# Patient Record
Sex: FEMALE | Race: WHITE | NOT HISPANIC OR LATINO | URBAN - NONMETROPOLITAN AREA
[De-identification: names, ages, dates, MRNs, and addresses within clinical notes are randomized per-mention and may not be internally consistent; named-entity substitution may affect disease eponyms.]

---

## 2022-02-10 ENCOUNTER — TELEMEDICINE (OUTPATIENT)
Dept: PSYCHIATRY | Facility: CLINIC | Age: 32
End: 2022-02-10

## 2022-02-10 DIAGNOSIS — F33.2 SEVERE EPISODE OF RECURRENT MAJOR DEPRESSIVE DISORDER, WITHOUT PSYCHOTIC FEATURES: Primary | ICD-10-CM

## 2022-02-10 DIAGNOSIS — F41.1 GENERALIZED ANXIETY DISORDER: ICD-10-CM

## 2022-02-10 DIAGNOSIS — Z79.899 MEDICATION MANAGEMENT: ICD-10-CM

## 2022-02-10 PROCEDURE — 90792 PSYCH DIAG EVAL W/MED SRVCS: CPT | Performed by: NURSE PRACTITIONER

## 2022-02-10 RX ORDER — BUPROPION HYDROCHLORIDE 150 MG/1
150 TABLET ORAL EVERY MORNING
Qty: 30 TABLET | Refills: 0 | Status: SHIPPED | OUTPATIENT
Start: 2022-02-10 | End: 2022-03-10 | Stop reason: SDUPTHER

## 2022-02-10 RX ORDER — HYDROXYZINE PAMOATE 25 MG/1
25 CAPSULE ORAL 3 TIMES DAILY PRN
Qty: 45 CAPSULE | Refills: 0 | Status: SHIPPED | OUTPATIENT
Start: 2022-02-10 | End: 2022-03-10 | Stop reason: SDUPTHER
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
## 2022-03-10 ENCOUNTER — TELEMEDICINE (OUTPATIENT)
Dept: PSYCHIATRY | Facility: CLINIC | Age: 32
End: 2022-03-10

## 2022-03-10 DIAGNOSIS — F41.1 GENERALIZED ANXIETY DISORDER: ICD-10-CM

## 2022-03-10 DIAGNOSIS — Z79.899 MEDICATION MANAGEMENT: ICD-10-CM

## 2022-03-10 DIAGNOSIS — F33.2 SEVERE EPISODE OF RECURRENT MAJOR DEPRESSIVE DISORDER, WITHOUT PSYCHOTIC FEATURES: Primary | ICD-10-CM

## 2022-03-10 PROCEDURE — 99214 OFFICE O/P EST MOD 30 MIN: CPT | Performed by: NURSE PRACTITIONER

## 2022-03-10 RX ORDER — BUSPIRONE HYDROCHLORIDE 7.5 MG/1
TABLET ORAL
Qty: 60 TABLET | Refills: 0 | Status: SHIPPED | OUTPATIENT
Start: 2022-03-10 | End: 2022-04-14 | Stop reason: SDUPTHER

## 2022-03-10 RX ORDER — HYDROXYZINE PAMOATE 25 MG/1
25 CAPSULE ORAL 3 TIMES DAILY PRN
Qty: 45 CAPSULE | Refills: 0 | Status: SHIPPED | OUTPATIENT
Start: 2022-03-10 | End: 2022-09-15

## 2022-03-10 RX ORDER — BUPROPION HYDROCHLORIDE 300 MG/1
300 TABLET ORAL EVERY MORNING
Qty: 30 TABLET | Refills: 0 | Status: SHIPPED | OUTPATIENT
Start: 2022-03-10 | End: 2022-04-13 | Stop reason: SDUPTHER

## 2022-04-07 ENCOUNTER — PATIENT MESSAGE (OUTPATIENT)
Dept: PSYCHIATRY | Facility: CLINIC | Age: 32
End: 2022-04-07

## 2022-04-07 ENCOUNTER — TELEMEDICINE (OUTPATIENT)
Dept: PSYCHIATRY | Facility: CLINIC | Age: 32
End: 2022-04-07

## 2022-04-07 DIAGNOSIS — F41.1 GENERALIZED ANXIETY DISORDER: ICD-10-CM

## 2022-04-07 DIAGNOSIS — F33.2 SEVERE EPISODE OF RECURRENT MAJOR DEPRESSIVE DISORDER, WITHOUT PSYCHOTIC FEATURES: Primary | ICD-10-CM

## 2022-04-07 PROCEDURE — 90791 PSYCH DIAGNOSTIC EVALUATION: CPT | Performed by: SOCIAL WORKER
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
## 2022-04-13 DIAGNOSIS — Z79.899 MEDICATION MANAGEMENT: ICD-10-CM

## 2022-04-13 DIAGNOSIS — F33.2 SEVERE EPISODE OF RECURRENT MAJOR DEPRESSIVE DISORDER, WITHOUT PSYCHOTIC FEATURES: ICD-10-CM

## 2022-04-13 DIAGNOSIS — F41.1 GENERALIZED ANXIETY DISORDER: ICD-10-CM

## 2022-04-13 RX ORDER — BUPROPION HYDROCHLORIDE 300 MG/1
300 TABLET ORAL EVERY MORNING
Qty: 30 TABLET | Refills: 0 | Status: SHIPPED | OUTPATIENT
Start: 2022-04-13 | End: 2022-04-14 | Stop reason: SDUPTHER

## 2022-04-14 ENCOUNTER — TELEMEDICINE (OUTPATIENT)
Dept: PSYCHIATRY | Facility: CLINIC | Age: 32
End: 2022-04-14

## 2022-04-14 DIAGNOSIS — Z79.899 MEDICATION MANAGEMENT: ICD-10-CM

## 2022-04-14 DIAGNOSIS — F33.2 SEVERE EPISODE OF RECURRENT MAJOR DEPRESSIVE DISORDER, WITHOUT PSYCHOTIC FEATURES: Primary | ICD-10-CM

## 2022-04-14 DIAGNOSIS — F41.1 GENERALIZED ANXIETY DISORDER: ICD-10-CM

## 2022-04-14 PROCEDURE — 99214 OFFICE O/P EST MOD 30 MIN: CPT | Performed by: NURSE PRACTITIONER

## 2022-04-14 RX ORDER — BUSPIRONE HYDROCHLORIDE 7.5 MG/1
TABLET ORAL
Qty: 60 TABLET | Refills: 2 | Status: SHIPPED | OUTPATIENT
Start: 2022-04-14 | End: 2022-09-15 | Stop reason: SDUPTHER

## 2022-04-14 RX ORDER — BUPROPION HYDROCHLORIDE 300 MG/1
300 TABLET ORAL EVERY MORNING
Qty: 30 TABLET | Refills: 2 | Status: SHIPPED | OUTPATIENT
Start: 2022-04-14 | End: 2022-09-15 | Stop reason: SDUPTHER

## 2022-05-12 ENCOUNTER — PATIENT MESSAGE (OUTPATIENT)
Dept: PSYCHIATRY | Facility: CLINIC | Age: 32
End: 2022-05-12

## 2022-05-12 ENCOUNTER — TELEMEDICINE (OUTPATIENT)
Dept: PSYCHIATRY | Facility: CLINIC | Age: 32
End: 2022-05-12

## 2022-05-12 DIAGNOSIS — F41.1 GENERALIZED ANXIETY DISORDER: ICD-10-CM

## 2022-05-12 DIAGNOSIS — F33.2 SEVERE EPISODE OF RECURRENT MAJOR DEPRESSIVE DISORDER, WITHOUT PSYCHOTIC FEATURES: Primary | ICD-10-CM

## 2022-05-12 PROCEDURE — 90837 PSYTX W PT 60 MINUTES: CPT | Performed by: SOCIAL WORKER

## 2022-05-12 NOTE — PROGRESS NOTES
"Date of Service: May 12, 2022  Time In: 9:00 am  Time Out: 9:55 am    PROGRESS NOTE  Data:The patient is a 32 y.o. person who met 1:1 with Mary Robledo, [facility] for regularly scheduled individual session.Verified the patients identity.  The Patient is  at home, using Epic Video Visit (HIPAA compliant). Patient is being seen via telehealth and stated they are in a secure environment for this session. The patient's condition being diagnosed/treated is appropriate for telemedicine. The provider identified herself and credentials [facility] .   The patient  consent to be seen remotely, and when consent is given they understand that the consent allows for patient identifiable information to be sent to a third party as needed.   They may refuse to be seen remotely at any time. The electronic data is encrypted and password protected, and the patient has been advised of the potential risks to privacy not withstanding such measured of the potential risks to privacy not withstanding such measures.    Alexandrea presents for session on time, clean and casually dressed  without evidence of intoxication, withdrawal, or perceptual disturbance.   The patient was open and engaged.    Chief Compliant: Patient presents with anxiety & depression  Interactive Complexity: Interactive Complexity No If yes, due to:     HPI: Data:  Patient shares that she has been able to change her thinking and \"just accept\" that Jorge Luis's Mom is who she is and its wont change.  She has begun to set boundaries with Jorge Luis's Mom and just take care of Jorge Luis and not worry about his Moms response.  Patient shares several interactions with John Mom that are difficult/stressful.  She shares that she has been able to accept \"what is\" and stop worrying about things she cannot control.  Patient shares that Jorge Luis comes back to her house and just wants to be with the patient.  Patient continue to be around horses which she find helpful. "   Depression Low mood for > 2 weeks, Feelings of guilt/worthlessness and Impaired concentration  Generalized Anxiety  Excess Worry, Restless/Edgy, Easily fatigued and Muscle tension. Onset of symptoms was vague.  Symptoms are associated with relationship problem with unchanged since last visit and lack of support.  Symptoms are aggravated by anxiety, sadness and stress.   Symptoms improve with medication management, therapy and personal self-care (wellness) Current rates severity of symptoms, on a scale of 1-10 (10 is the most severe) 4 Context Family and social history was reviewed a Quality improved.    CLINICAL MANEUVERING/INTERVENTION/SUPPORTIVE PSYCHOTHERAPY: Therapist continued to promote the therapeutic alliance, address the patient’s issues, and strengthen self awareness, insights, and coping skills.  Reviewed cognitive distortions and ways to challenge these negative thoughts. Processing the thoughts feelings with recent happening while also discussing ways to improve the transition for Jorge Luis when going from one parent to the other. Discussed foundations of mindfulness and sent handout to the PT for review and encouraged her to practice these skills. herapist applied CBT/REBT, Exploration of Coping Skills and Exploration of Relationship Patterns and encouraged the patient to use positive coping skills such as Exercising, Journaling, Listen to music, Reframe the way you are thinking about the problem, Self Care (Take care of your body in a way that makes you feel good - paint your nails, do your hair, put on a face mask), Establish healthy boundaries , Walk away (leave a situation that is causing you stress), Use positive self-talk, Keep a positive attitude, Keep calm by thinking and Utilize resources/coping skills.  Therapist allowed Alexandrea  to freely discuss issues without interruption or judgment. Provided safe, confidential environment to facilitate the development of positive therapeutic relationship  and encourage open, honest communication. Assisted patient in identifying increased risk factors which would indicate the need for higher level of care including thoughts to harm self or others, self-harming behavior, and/or binge drinking and encouraged patient to contact this office, call 911, or present to the nearest emergency room should any of these events occur. Discussed crisis intervention services and means to access.     Risk Assessment:  [x] No SI/HI, []  passive thoughts []  suicidal ideation , []  homicidal ideation, [] self-harm Explain Risk for self harm is low at this time.        Psychiatric/Behavioral: Negative for agitation, behavioral problems, decreased concentration, dysphoric mood, hallucinations, self-injury, sleep disturbance, suicidal ideas, negative for hyperactivity. Positive for depressed mood-improving and stress. The patient is nervous/anxious.          Mental Status Exam:    Hygiene:   good  Cooperation:  Cooperative  Eye Contact:  Good  Psychomotor Behavior:  Appropriate  Affect:  Full range and Appropriate  Hopelessness: Denies  Speech:  Normal  Goal directed and Linear  Thought Content:  Normal  Suicidal:  None  Homicidal:  None  Hallucinations:  None  Delusion:  None  Memory:  Intact  Orientation:  Person, Place, Time and Situation  Reliability:  good  Insight:  Good and Fair  Judgement:  Good  Impulse Control:  Good and Fair    Patient's Support Network Includes:  , son, parents and extended family  Progress toward goal: Not at goal  Functional Status: Moderate impairment   Overall: Anxious   VISIT DIAGNOSIS:     ICD-10-CM ICD-9-CM   1. Severe episode of recurrent major depressive disorder, without psychotic features ([facility])  F33.2 296.33   2. Generalized anxiety disorder  F41.1 300.02      PROGNOSIS: fair if patient follows treatment recommendations  The patient appears to be mentally/physically stable compared to his baseline functioning.  However, they continues to  present with a severe chronic mental illness. As a result,  they would be at significantly increased risk for decompensation and possibly higher level of care without continued treatment.  It is reasonable to assume they would considerably benefit from ongoing treatment.      PROGRESS TOWARD CURRENT PLAN OF CARE/TREATMENT PLAN:  Making Progress  SHORT-TERM GOALS: The patient will  will learn and practice at least 2 anxiety management techniques with goal of decreasing anxiety, will learn and practice at least 2 depression management techniques with goal of decreasing depression, will work with therapist to help expose and extinguish irrational beliefs and conclusions that contribute to anxiety/depression, will engage in one self-care activity daily, per self-report and will engage in one enjoyable activity daily, per self-report   LONG-TERM GOALS: With the help of therapy, I would like to:   learn how to relax and take it easy and learn how to enjoy life and have fun  clarify or come to terms with expectations or feelings related to my partner, spouse, or significant other, learn how to be more assertive with others and set appropriate boundaries and learn how to handle other people's reactions to my behavior (criticism, rejection, praise, etc.).  come to terms with things that happened in the past and understand more clearly who I am, what I' m capable of, and what I want out of life  clarify my needs and desires and learn how to express them more effectively, allow myself to experience feelings and express them more effectively and learn how to deal with strong negative feelings (e.g., anger, rage)  learn how to cope with negative thoughts, ruminations, or sense of guilt, find a way out of negative mood, sadness, or sense of inner emptiness, learn how be more organized in daily life and learn how to handle stressful situations better  STRENGTHS: Motivated for treatment, Literate, Employed, Good family support and  "Articulate  WEAKNESSES: Poor social support and Substance use  Plan   Crisis Plan:  Symptoms and/or behaviors to indicate a crisis: Extreme mood changes; including uncontrollable \"highs\" or euphoria and Thinking about suicide    What calming techniques or other strategies will patient use to de-esclate and stay safe: slow down, breathe, visualize calming self, think it though, listen to music, change focus, take a walk  Who is one person patient can contact to assist with de-escalation? Saurav Tirado  Crisis Management: the Patient will contact staff or crisis line if symptoms exacerbate or if harm to self or others becomes a concern. Crisis resources include: Crisis Line [phone], 911, Local Law Enforcement, [facility], [facility] 24/7 Emergency Room [phone].  PLAN:   Will continue in MONTHLY or AS NEEDED ongoing outpatient treatment via Face-to-Facewith primary therapist and pharmacotherapy as scheduled.   Will  report any adverse reactions to treatment/medication interventions immediately.  Will be compliant with treatment and appointments.   May 12, 2022 09:04 EDT    Recommended Referrals: Psychiatrist/APRN and Medical Provider (PCP)  Patient will adhere to medication regimen as prescribed and report any side effects. Patient will contact this office, call 911 or present to the nearest emergency room should suicidal or homicidal ideations occur. Provide Cognitive Behavioral Therapy and Solution Focused Therapy to improve functioning, maintain stability, and avoid decompensation and the need for higher level of care.          Future Appointments       Provider Department Center    7/21/2022 9:00 AM Mounika Perez APRN [facility]          May 12, 2022 09:04 EDT      Mary Robledo LCSW, [facility]           "

## 2022-06-23 ENCOUNTER — PATIENT MESSAGE (OUTPATIENT)
Dept: PSYCHIATRY | Facility: CLINIC | Age: 32
End: 2022-06-23

## 2022-06-23 ENCOUNTER — TELEMEDICINE (OUTPATIENT)
Dept: PSYCHIATRY | Facility: CLINIC | Age: 32
End: 2022-06-23

## 2022-06-23 DIAGNOSIS — F33.2 SEVERE EPISODE OF RECURRENT MAJOR DEPRESSIVE DISORDER, WITHOUT PSYCHOTIC FEATURES: Primary | ICD-10-CM

## 2022-06-23 DIAGNOSIS — F41.1 GENERALIZED ANXIETY DISORDER: ICD-10-CM

## 2022-06-23 PROCEDURE — 90837 PSYTX W PT 60 MINUTES: CPT | Performed by: SOCIAL WORKER

## 2022-06-23 NOTE — PROGRESS NOTES
Date of Service: June 23, 2022  Time In: 9:00 am  Time Out: 9:54 am    PROGRESS NOTE    Data:The patient is a 32 y.o. person who met 1:1 with Mary Robledo, [facility] for regularly scheduled individual session.Verified the patients identity.  The Patient is  at home, using Epic Video Visit (HIPAA compliant). Patient is being seen via telehealth and stated they are in a secure environment for this session. The patient's condition being diagnosed/treated is appropriate for telemedicine. The provider identified herself and credentials [facility] .   The patient  consent to be seen remotely, and when consent is given they understand that the consent allows for patient identifiable information to be sent to a third party as needed.   They may refuse to be seen remotely at any time. The electronic data is encrypted and password protected, and the patient has been advised of the potential risks to privacy not withstanding such measured of the potential risks to privacy not withstanding such measures.    Alexandrea presents for session on time, clean and casually dressed  without evidence of intoxication, withdrawal, or perceptual disturbance.   The patient was open and engaged.Chief Compliant: Patient presents with anxiety & depression mild    HPI: Data:  Patient shares that she is feeling pretty good and is looking forward to vacation in 2 weeks.  She took of a total of 2 weeks, 1 traveling and 1 at home. Shares past argument with  and handled it better and less reactivity.  Shares ongoing stress with step-sons Mom and how she was able to manage this without reacting. Cals Mom also ruined husbands birthday.  Patient shares that she is not having any noticeable symptoms of depression, coworkers have noticed improvement in her mood and she feels more in control of the low moods.  Anxiety is more situational, especially when she is around Callums bio-Mom or her family.  She shares that she tries to avoid  "any conflict with them but still finds herself worried. Transitions between houses is better but his whinnying is tiring.   Had some worries about work and \"getting closer to bonus\".  Favorite boss will be leaving and patient is concerned that if old boss comes back she will not be able to survive. Has plans to discuss with head boss about her concerns.   Onset of symptoms was vague.  Symptoms are associated with relationship problem with step-sons Mom and stable monogamous marriage and lack of support.  Symptoms are aggravated by anxiety, sadness and stress.   Symptoms improve with medication management, therapy, self-management and personal self-care (wellness) Current rates severity of symptoms, on a scale of 1-10 (10 is the most severe) 4 Context Family and social history was reviewed  Quality much improved.    CLINICAL MANEUVERING/INTERVENTION/SUPPORTIVE PSYCHOTHERAPY: Therapist continued to promote the therapeutic alliance, address the patient’s issues, and strengthen self awareness, insights, and coping skills.  Reviewed progress and praised for her progress. Continued to assist the patient to identify negative cognitions, predicting the future, should statements or jumping to conclusions.   Therapist applied CBT/REBT, Exploration of Coping Skills, Interactive Feedback, Mindfulness Training and Review of Treatment Plan and encouraged the patient to use positive coping skills such as Exercising, Listen to music, Spending time in nature, Meditation/Practice yoga, Reframe the way you are thinking about the problem, Self Care (Take care of your body in a way that makes you feel good - paint your nails, do your hair, put on a face mask), Establish healthy boundaries , Use positive self-talk, Keep a positive attitude, Take deep breaths, Keep calm by thinking and Utilize resources/coping skills.  Therapist allowed Alexandrea  to freely discuss issues without interruption or judgment. Provided safe, confidential " environment to facilitate the development of positive therapeutic relationship and encourage open, honest communication. Assisted patient in identifying increased risk factors which would indicate the need for higher level of care including thoughts to harm self or others, self-harming behavior, and/or binge drinking and encouraged patient to contact this office, call 911, or present to the nearest emergency room should any of these events occur. Discussed crisis intervention services and means to access.     Risk Assessment:  [x] No SI/HI, []  passive thoughts []  suicidal ideation , []  homicidal ideation, [] self-harm Explain Risk of self-harm is low, but could be further elevated in the event of treatment noncompliance and/or AODA.  Assessment      Psychiatric/Behavioral: Negative for agitation, behavioral problems, decreased concentration, dysphoric mood, hallucinations, self-injury, sleep disturbance, suicidal ideas, negative for hyperactivity. Positive for stress. The patient is nervous/anxious.          Mental Status Exam:    Hygiene:   good  Cooperation:  Cooperative  Eye Contact:  Good  Psychomotor Behavior:  Appropriate  Affect:  Full range and Appropriate  Hopelessness: Denies  Speech:  Normal  Goal directed and Linear  Thought Content:  Normal and Mood congruent  Suicidal:  None  Homicidal:  None  Hallucinations:  None  Delusion:  None  Memory:  Intact  Orientation:  Person, Place, Time and Situation  Reliability:  good  Insight:  Good and Fair  Judgement:  Good  Impulse Control:  Good    Patient's Support Network Includes:   and extended family  Progress toward goal: Not at goal  Functional Status: Moderate impairment   Overall: Anxious   VISIT DIAGNOSIS:     ICD-10-CM ICD-9-CM   1. Severe episode of recurrent major depressive disorder, without psychotic features ([facility])  F33.2 296.33   2. Generalized anxiety disorder  F41.1 300.02      PROGNOSIS: fair if patient follows treatment  recommendations  The patient appears to be mentally/physically stable compared to his baseline functioning.  However, they continues to present with a severe chronic mental illness. As a result,  they would be at significantly increased risk for decompensation and possibly higher level of care without continued treatment.  It is reasonable to assume they would considerably benefit from ongoing treatment.     PROGRESS TOWARD CURRENT PLAN OF CARE/TREATMENT PLAN:  Making Progress  SHORT-TERM GOALS: The patient will  will learn and practice at least 2 anxiety management techniques with goal of decreasing anxiety, will learn and practice at least 2 depression management techniques with goal of decreasing depression, will engage in one self-care activity daily, per self-report and will engage in one enjoyable activity daily, per self-report     LONG-TERM GOALS: With the help of therapy, I would like to:   learn how to relax and take it easy and learn how to enjoy life and have fun  learn how to be more assertive with others and set appropriate boundaries and learn how to handle other people's reactions to my behavior (criticism, rejection, praise, etc.).  come to terms with things that happened in the past and understand more clearly who I am, what I' m capable of, and what I want out of life  clarify my needs and desires and learn how to express them more effectively, learn how to adjust overly high expectations I have in myself or others, allow myself to experience feelings and express them more effectively and learn how to deal with strong negative feelings (e.g., anger, rage)  learn how to cope with negative thoughts, ruminations, or sense of guilt, find a way out of negative mood, sadness, or sense of inner emptiness, learn how be more organized in daily life and learn how to handle stressful situations better    STRENGTHS: Motivated for treatment, Literate, Employed, Good family support and Articulate    WEAKNESSES:  Poor social support and Poor coping skills    Plan   Crisis Plan:  Symptoms and/or behaviors to indicate a crisis: Thinking about suicide    What calming techniques or other strategies will patient use to de-esclate and stay safe: slow down, breathe, visualize calming self, think it though, listen to music, change focus, take a walk  Who is one person patient can contact to assist with de-escalation? [place]  Crisis Management: the Patient will contact staff or crisis line if symptoms exacerbate or if harm to self or others becomes a concern. Crisis resources include: Crisis Line [phone], 911, Local Law Enforcement, [facility] [place], [facility] 24/7 Emergency Room [phone].  PLAN:   Will continue in MONTHLY ongoing outpatient treatment via Teleheath Video via Epic Video Visit (HIPAA compliant)with primary therapist and pharmacotherapy as scheduled.   Will  report any adverse reactions to treatment/medication interventions immediately.  Will be compliant with treatment and appointments.   June 23, 2022 09:00 EDT  Recommended Referrals: Psychiatrist/APRN and Medical Provider (PCP)  Patient will adhere to medication regimen as prescribed and report any side effects. Patient will contact this office, call 911 or present to the nearest emergency room should suicidal or homicidal ideations occur. Provide Cognitive Behavioral Therapy and Solution Focused Therapy to improve functioning, maintain stability, and avoid decompensation and the need for higher level of care.        Future Appointments       Provider Department Center    7/21/2022 9:00 AM Mounika Perez APRN [facility]    8/4/2022 9:00 AM Mary Dias LCSW [facility]        June 23, 2022 09:00 EDT    Mary Robledo LCSW, [facility]

## 2022-08-04 ENCOUNTER — TELEMEDICINE (OUTPATIENT)
Dept: PSYCHIATRY | Facility: CLINIC | Age: 32
End: 2022-08-04

## 2022-08-04 DIAGNOSIS — F41.1 GENERALIZED ANXIETY DISORDER: ICD-10-CM

## 2022-08-04 DIAGNOSIS — F33.2 SEVERE EPISODE OF RECURRENT MAJOR DEPRESSIVE DISORDER, WITHOUT PSYCHOTIC FEATURES: Primary | ICD-10-CM

## 2022-08-04 PROCEDURE — 90834 PSYTX W PT 45 MINUTES: CPT | Performed by: SOCIAL WORKER

## 2022-08-04 NOTE — PROGRESS NOTES
"Date of Service: August 4, 2022  Time In: 9:00 am  Time Out: 9:55 am  PROGRESS NOTE  Data:The patient is a 32 y.o. person who met 1:1 with Mary Robledo, SOL KIM[facility]ALL for regularly scheduled individual session.Verified the patients identity.  The Patient is  at home, using Epic Video Visit (HIPAA compliant). Patient is being seen via telehealth and stated they are in a secure environment for this session. The patient's condition being diagnosed/treated is appropriate for telemedicine. The provider identified herself and credentials SOL [facility] .   The patient  consent to be seen remotely, and when consent is given they understand that the consent allows for patient identifiable information to be sent to a third party as needed.   They may refuse to be seen remotely at any time. The electronic data is encrypted and password protected, and the patient has been advised of the potential risks to privacy not withstanding such measured of the potential risks to privacy not withstanding such measures.    Alexandrea presents for session on time, clean and casually dressed  without evidence of intoxication, withdrawal, or perceptual disturbance.   The patient was open and engaged.      Chief Compliant: Patient presents with anxiety & depression    HPI: Data:  Went on vacation and had a few blow-ups when her future mother-in-law was being awful on the way home and \"I just had it\".  EFRAÍN hates the heat and they were in [place]. EFRAÍN was upset when PT took her 5 yr old to the bathroom and EFRAÍN felt ignored and left out.  EFRAÍN often butted in while she was trying to disciplining her son.  Overall the vacation was good but EFRAÍN wont be coming next time.  EFRAÍN says wont be coming to her house weekly like she had in the past.  Son has been crying when he has to come to their house.  Son Andi says he does not know why he is upset coming to there house. Gives updates about work, making her bonus, her boss has left and there are several " new people that are interviewing. The old boss told her that if things get worse there could be a position at his new place of employment-which is reassuring to have a back up plan.  Taking a break from horse back riding trying to save money. 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  Iron and B 12 deficiency which is being treated.   Depression Feelings of guilt/worthlessness and Low energy  Generalized Anxiety  Excess Worry, Restless/Edgy and Muscle tension. Onset of symptoms was vague.  Symptoms are associated with relationship problem with mother-in-law and sons biological mom/step dad and lack of support.  Symptoms are aggravated by anxiety and stress.   Symptoms improve with medication management, therapy and personal self-care (wellness) Current rates severity of symptoms, on a scale of 1-10 (10 is the most severe) 4 Context Family and social history was reviewed Quality improved.    CLINICAL MANEUVERING/INTERVENTION/SUPPORTIVE PSYCHOTHERAPY: Therapist continued to promote the therapeutic alliance, address the patient’s issues, and strengthen self awareness, insights, and coping skills. Processing patient feelings and thoughts that were triggered while on vacation.Discussing discharge plans and what she needs to do to maintain progress.   Taking Wellbutrin consistently, using buspar prn.  Therapist applied CBT/REBT, Cognitive Challenging, Person Centered and Positive Coping Skills and encouraged the patient to use positive coping skills such as Exercising, Journaling, Playing with a pet, Reframe the way you are thinking about the problem, Self Care (Take care of your body in a way that makes you feel good - paint your nails, do your hair, put on a face mask), Time management (Work on managing your time better - for example, turn off the alerts on your phone), Establish healthy boundaries , Use positive self-talk, Keep a positive attitude,  Laugh (Do something fun), Keep calm by  thinking and Utilize resources/coping skills.  Therapist allowed Alexandrea  to freely discuss issues without interruption or judgment. Provided safe, confidential environment to facilitate the development of positive therapeutic relationship and encourage open, honest communication. Assisted patient in identifying increased risk factors which would indicate the need for higher level of care including thoughts to harm self or others, self-harming behavior, and/or binge drinking and encouraged patient to contact this office, call 911, or present to the nearest emergency room should any of these events occur. Discussed crisis intervention services and means to access.    PHQ-9 Total Score:   0  ERIN-7  2  Feeling nervous, anxious or on edge: Several days  Not being able to stop or control worrying: Not at all  Worrying too much about different things: Several days  Trouble Relaxing: Not at all  Being so restless that it is hard to sit still: Not at all  Becoming easily annoyed or irritable: Not at all  ERIN 7 Total Score: 2  Risk Assessment:  [x] No SI/HI, []  passive thoughts []  suicidal ideation , []  homicidal ideation, [] self-harm Explain Risk of self-harm is low, but could be further elevated in the event of treatment noncompliance and/or AODA.  Assessment   Psychiatric/Behavioral: Negative for agitation, behavioral problems, decreased concentration, dysphoric mood, hallucinations, self-injury, sleep disturbance, suicidal ideas, negative for hyperactivity. Positive for depressed mood-much improved and stress. The patient is nervous/anxious.      Mental Status Exam:    Hygiene:   good  Cooperation:  Cooperative  Eye Contact:  Good  Psychomotor Behavior:  Appropriate  Affect:  Appropriate  Hopelessness: Denies  Speech:  Normal  Goal directed and Linear  Thought Content:  Normal and Mood congruent  Suicidal:  None  Homicidal:  None  Hallucinations:  None  Delusion:  None  Memory:  Intact  Orientation:  Person, Place,  Time and Situation  Reliability:  good  Insight:  Good  Judgement:  Good  Impulse Control:  Good    Patient's Support Network Includes:  , son and extended family    Progress toward goal: Not at goal    Functional Status: Moderate impairment     Overall: WNL     VISIT DIAGNOSIS:     ICD-10-CM ICD-9-CM   1. Severe episode of recurrent major depressive disorder, without psychotic features ([facility])  F33.2 296.33   2. Generalized anxiety disorder  F41.1 300.02        PROGNOSIS: good if patient follows treatment recommendations    The patient appears to be mentally/physically stable compared to his baseline functioning.  However, they continues to present with a severe chronic mental illness. As a result,  they would be at significantly increased risk for decompensation and possibly higher level of care without continued treatment.  It is reasonable to assume they would considerably benefit from ongoing treatment.          PROGRESS TOWARD CURRENT PLAN OF CARE/TREATMENT PLAN:  Making Progress    SHORT-TERM GOALS: The patient will  will work with therapist to help expose and extinguish irrational beliefs and conclusions that contribute to anxiety/depression, will work with therapist to identify conflicts from the past and the present that form the basis, will engage in one self-care activity daily, per self-report and will engage in one enjoyable activity daily, per self-report     LONG-TERM GOALS: With the help of therapy, I would like to:   learn how to relax and take it easy and learn how to enjoy life and have fun  improve or clarify my relationship with people I know, learn how to be more assertive with others and set appropriate boundaries and learn how to handle other people's reactions to my behavior (criticism, rejection, praise, etc.).  understand more clearly who I am, what I' m capable of, and what I want out of life  clarify my needs and desires and learn how to express them more effectively, allow myself  to experience feelings and express them more effectively and learn how to deal with strong negative feelings (e.g., anger, rage)  learn how to cope with negative thoughts, ruminations, or sense of guilt, find a way out of negative mood, sadness, or sense of inner emptiness, cope with specific problems related to work, school, or training, learn how be more organized in daily life and learn how to handle stressful situations better    STRENGTHS: Motivated for treatment, Literate, Employed, Good family support, Articulate and Has insight    WEAKNESSES: Poor social support and Poor coping skills    Plan   Crisis Plan:  Symptoms and/or behaviors to indicate a crisis: Thinking about suicide    What calming techniques or other strategies will patient use to de-esclate and stay safe: slow down, breathe, visualize calming self, think it though, listen to music, change focus, take a walk  Who is one person patient can contact to assist with de-escalation? [place]    Crisis Management: the Patient will contact staff or crisis line if symptoms exacerbate or if harm to self or others becomes a concern. Crisis resources include: Crisis Line [phone], 911, Local Law Enforcement, Lists of hospitals in the [place], Psychiatric 24/7 Emergency Room [phone].    PLAN: Will increase length of time between sessions to see if patient can maintain progress. If so then will close her therapy treatment until the time she wants to re-engage in treatment.  Will continue in MONTHLY or QUARTERLY ongoing outpatient treatment via Teleheath Video via Epic Video Visit (HIPAA compliant)with primary therapist and pharmacotherapy as scheduled.   Will  report any adverse reactions to treatment/medication interventions immediately.  Will be compliant with treatment and appointments.   August 4, 2022 09:45 EDT    Recommended Referrals: Psychiatrist/APRN  Patient will adhere to medication regimen as prescribed and report any side effects. Patient will contact this office,  call 911 or present to the nearest emergency room should suicidal or homicidal ideations occur. Provide Cognitive Behavioral Therapy and Solution Focused Therapy to improve functioning, maintain stability, and avoid decompensation and the need for higher level of care.          Future Appointments       Provider Department Center    8/10/2022 4:15 PM Mounika Perez APRN [facility]          August 4, 2022 09:45 EDT      Mary Robledo, JIGRA, [facility]

## 2022-08-04 NOTE — TREATMENT PLAN
Multi-Disciplinary Problems (from Behavioral Health Treatment Plan)    Active Problems     Problem: Anxiety  Start Date: 05/11/22    Problem Details: The patient self-scales this problem as a 6 with 10 being the worst.        Goal Priority Start Date Expected End Date End Date    Patient will develop and implement behavioral and cognitive strategies to reduce anxiety and irrational fears. -- 08/04/22 -- --    Goal Details: Progress toward goal:  The patient self-scales their progress related to this goal as a 3 with 10 being the worst.        Goal Intervention Frequency Start Date End Date    Help patient explore past emotional issues in relation to present anxiety. Q Month 08/04/22 --    Intervention Details: Duration of treatment until until discharged.        Goal Intervention Frequency Start Date End Date    Help patient develop an awareness of their cognitive and physical responses to anxiety. Q Month 08/04/22 --    Intervention Details: Duration of treatment until until discharged.              Problem: Depression  Start Date: 08/04/22    Problem Details: The patient self-scales this problem as a 6 with 10 being the worst.        Goal Priority Start Date Expected End Date End Date    Patient will demonstrate the ability to initiate new constructive life skills outside of sessions on a consistent basis. -- 08/04/22 -- --    Goal Details: Progress toward goal:  The patient self-scales their progress related to this goal as a 3 with 10 being the worst.        Goal Intervention Frequency Start Date End Date    Assist patient in setting attainable activities of daily living goals. PRN 08/04/22 --    Goal Intervention Frequency Start Date End Date    Provide education about depression Q Month 08/04/22 --    Intervention Details: Duration of treatment until until discharged.        Goal Intervention Frequency Start Date End Date    Assist patient in developing healthy coping strategies. Q Month 08/04/22 --     Intervention Details: Duration of treatment until until discharged.                       Patient has continued to make progress towards reduction in anxiety & depression.     Reviewed the plan with the patient.
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
## 2022-09-15 ENCOUNTER — TELEMEDICINE (OUTPATIENT)
Dept: PSYCHIATRY | Facility: CLINIC | Age: 32
End: 2022-09-15

## 2022-09-15 DIAGNOSIS — F41.1 GENERALIZED ANXIETY DISORDER: ICD-10-CM

## 2022-09-15 DIAGNOSIS — Z79.899 MEDICATION MANAGEMENT: ICD-10-CM

## 2022-09-15 DIAGNOSIS — F33.2 SEVERE EPISODE OF RECURRENT MAJOR DEPRESSIVE DISORDER, WITHOUT PSYCHOTIC FEATURES: Primary | ICD-10-CM

## 2022-09-15 PROCEDURE — 99214 OFFICE O/P EST MOD 30 MIN: CPT | Performed by: NURSE PRACTITIONER

## 2022-09-15 RX ORDER — BUSPIRONE HYDROCHLORIDE 7.5 MG/1
TABLET ORAL
Qty: 60 TABLET | Refills: 2 | Status: SHIPPED | OUTPATIENT
Start: 2022-09-15 | End: 2022-12-15 | Stop reason: SDUPTHER

## 2022-09-15 RX ORDER — BUPROPION HYDROCHLORIDE 300 MG/1
300 TABLET ORAL EVERY MORNING
Qty: 30 TABLET | Refills: 2 | Status: SHIPPED | OUTPATIENT
Start: 2022-09-15 | End: 2022-12-15 | Stop reason: SDUPTHER

## 2022-09-15 RX ORDER — HYDROXYZINE PAMOATE 25 MG/1
25 CAPSULE ORAL 3 TIMES DAILY PRN
Qty: 45 CAPSULE | Refills: 2 | Status: SHIPPED | OUTPATIENT
Start: 2022-09-15 | End: 2022-12-15 | Stop reason: SDUPTHER

## 2022-12-08 NOTE — PROGRESS NOTES
This provider is located at the [facility] (through [facility]), [address], [place], [address] using a secure MyChart Video Visit through [employer]. Patient is being seen remotely via telehealth at their home address in [place], and stated they are in a secure environment for this session. The patient's condition being diagnosed/treated is appropriate for telemedicine. The provider identified herself as well as her credentials.   The patient, and/or patients guardian, consent to be seen remotely, and when consent is given they understand that the consent allows for patient identifiable information to be sent to a third party as needed.   They may refuse to be seen remotely at any time. The electronic data is encrypted and password protected, and the patient and/or guardian has been advised of the potential risks to privacy not withstanding such measures.    Subjective   Alexandrea Markham is a 32 y.o. female who presents today for follow up    Chief Complaint:  Depression and anxiety  The is a follow up visit.  She is taking her medication as prescribed, denies side effects. She has been ill with flu and cough, but improving. Things are going really good. She received a bonus and raise at work. She and significant other are doing well. 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   Anxiety rated 2/10, depression rated 0/10, with 10 being the worst.   Sleep is good, getting about 9 hours a night, denies NM.  Appetite is good. She is working out and trying to lose weight.   Denies SI/HI/AVH.  Denies thoughts of self-harm.  Chronic health issues, no acute physical or medical issues today      The following portions of the patient's history were reviewed and updated as appropriate: allergies, current medications, past family history, past medical history, past social history, past surgical history and problem list.      Past  Medical History:  History reviewed. No pertinent past medical history.    Social History:  Social History     Socioeconomic History   • Marital status: Single       Family History:  History reviewed. No pertinent family history.    Past Surgical History:  History reviewed. No pertinent surgical history.    Problem List:  There is no problem list on file for this patient.       Allergy:   Allergies   Allergen Reactions   • Sulfa Antibiotics Hives        Current Medications:   Current Outpatient Medications   Medication Sig Dispense Refill   • buPROPion XL (Wellbutrin XL) 300 MG 24 hr tablet Take 1 tablet by mouth Every Morning. 30 tablet 2   • busPIRone (BUSPAR) 7.5 MG tablet Take 1 tablet twice daily. 60 tablet 2   • hydrOXYzine pamoate (Vistaril) 25 MG capsule Take 1 capsule by mouth 3 (Three) Times a Day As Needed for Anxiety. 45 capsule 2     No current facility-administered medications for this visit.       Review of Symptoms:    Review of Systems   Respiratory: Positive for cough.    Psychiatric/Behavioral: Positive for dysphoric mood and depressed mood. Negative for hallucinations, self-injury, sleep disturbance, suicidal ideas and stress. The patient is nervous/anxious.    All other systems reviewed and are negative.        Physical Exam:   There were no vitals taken for this visit.  There is no height or weight on file to calculate BMI.    Appearance:  female appears stated age, no acute distress noted.    Gait, Station, Strength: Steady, posture erect, WNL      Mental Status Exam: 12/15/22  Hygiene:   good  Cooperation:  Cooperative  Eye Contact:  Good  Psychomotor Behavior:  Appropriate  Affect:  Appropriate  Mood: normal  Hopelessness: Denies  Speech:  Normal  Thought Process:  Linear  Thought Content:  Mood congruent  Suicidal:  None  Homicidal:  None  Hallucinations:  None  Delusion:  None  Memory:  Intact  Orientation:  Person, Place, Time and Situation  Reliability:  good  Insight:   Good  Judgement:  Good  Impulse Control:  Good  Physical/Medical Issues:  No      PHQ-Score Total:  PHQ-9 Total Score:        Lab Results:     Reviewed most recent lab reports 2/2022    No visits with results within 1 Month(s) from this visit.   Latest known visit with results is:   No results found for any previous visit.       Assessment & Plan   Problems Addressed this Visit    None  Visit Diagnoses     Severe episode of recurrent major depressive disorder, without psychotic features (HCC)    -  Primary    Relevant Medications    hydrOXYzine pamoate (Vistaril) 25 MG capsule    busPIRone (BUSPAR) 7.5 MG tablet    buPROPion XL (Wellbutrin XL) 300 MG 24 hr tablet    Generalized anxiety disorder        Relevant Medications    hydrOXYzine pamoate (Vistaril) 25 MG capsule    busPIRone (BUSPAR) 7.5 MG tablet    buPROPion XL (Wellbutrin XL) 300 MG 24 hr tablet    Medication management        Relevant Medications    hydrOXYzine pamoate (Vistaril) 25 MG capsule    buPROPion XL (Wellbutrin XL) 300 MG 24 hr tablet      Diagnoses       Codes Comments    Severe episode of recurrent major depressive disorder, without psychotic features (HCC)    -  Primary ICD-10-CM: F33.2  ICD-9-CM: 296.33     Generalized anxiety disorder     ICD-10-CM: F41.1  ICD-9-CM: 300.02     Medication management     ICD-10-CM: Z79.899  ICD-9-CM: V58.69         Social History     Tobacco Use   Smoking Status Not on file   Smokeless Tobacco Not on file     MALLORY reviewed and appropriate. Patient counseled on use of controlled substances.     -The benefits of a healthy diet and exercise were discussed with patient, especially the positive effects they have on mental health. Patient encouraged to consider lifestyle modification regarding  diet and exercise patterns to maximize results of mental health treatment.  -Reviewed previous available documentation  -Reviewed most recent available labs   -Pt has no previous history of seizure or current eating  disorder, which would be a contraindication for use. The possibility of activation with initiation was discussed and patient verbalizes understanding.        Visit Diagnoses:    ICD-10-CM ICD-9-CM   1. Severe episode of recurrent major depressive disorder, without psychotic features ([facility])  F33.2 296.33   2. Generalized anxiety disorder  F41.1 300.02   3. Medication management  Z79.899 V58.69       TREATMENT PLAN/GOALS: Continue supportive psychotherapy efforts and medications as indicated. Treatment and medication options discussed during today's visit. Patient acknowledged and verbally consented to continue with current treatment plan and was educated on the importance of compliance with treatment and follow-up appointments.    MEDICATION ISSUES:    Discussed medication options and treatment plan of prescribed medication as well as the risks, benefits, and side effects including potential falls, possible impaired driving and metabolic adversities among others. Patient is agreeable to call the office with any worsening of symptoms or onset of side effects. Patient is agreeable to call 911 or go to the nearest ER should he/she begin having SI/HI.     MEDS ORDERED DURING VISIT:  New Medications Ordered This Visit   Medications   • hydrOXYzine pamoate (Vistaril) 25 MG capsule     Sig: Take 1 capsule by mouth 3 (Three) Times a Day As Needed for Anxiety.     Dispense:  45 capsule     Refill:  2   • busPIRone (BUSPAR) 7.5 MG tablet     Sig: Take 1 tablet twice daily.     Dispense:  60 tablet     Refill:  2   • buPROPion XL (Wellbutrin XL) 300 MG 24 hr tablet     Sig: Take 1 tablet by mouth Every Morning.     Dispense:  30 tablet     Refill:  2     -Continue Wellbutrin  mg 24-hour tablet take 1 tablet by mouth every morning for depression and anxiety  -Continue hydroxyzine 25 mg capsule take 1 capsule by mouth 3 times a day as needed for anxiety.   -Continue buspirone 7.5 mg tablet take 1 tablet by mouth twice daily  for anxiety  -Recommend psychotherapy.    Return in about 3 months (around 3/15/2023).    I spent 30 minutes caring for Alexandrea on this date of service. This time includes time spent by me in the following activities: preparing for the visit, obtaining and/or reviewing a separately obtained history, performing a medically appropriate examination and/or evaluation, counseling and educating the patient/family/caregiver, ordering medications, tests, or procedures and documenting information in the medical record             This document has been electronically signed by HORACIO Cornelius  December 15, 2022 08:50 EST    Part of this note may be an electronic transcription/translation of spoken language to printed text using the Dragon Dictation System.

## 2022-12-15 ENCOUNTER — TELEMEDICINE (OUTPATIENT)
Dept: PSYCHIATRY | Facility: CLINIC | Age: 32
End: 2022-12-15

## 2022-12-15 DIAGNOSIS — F33.2 SEVERE EPISODE OF RECURRENT MAJOR DEPRESSIVE DISORDER, WITHOUT PSYCHOTIC FEATURES: Primary | ICD-10-CM

## 2022-12-15 DIAGNOSIS — Z79.899 MEDICATION MANAGEMENT: ICD-10-CM

## 2022-12-15 DIAGNOSIS — F41.1 GENERALIZED ANXIETY DISORDER: ICD-10-CM

## 2022-12-15 PROCEDURE — 99214 OFFICE O/P EST MOD 30 MIN: CPT | Performed by: NURSE PRACTITIONER

## 2022-12-15 RX ORDER — HYDROXYZINE PAMOATE 25 MG/1
25 CAPSULE ORAL 3 TIMES DAILY PRN
Qty: 45 CAPSULE | Refills: 2 | Status: SHIPPED | OUTPATIENT
Start: 2022-12-15 | End: 2023-03-03

## 2022-12-15 RX ORDER — BUPROPION HYDROCHLORIDE 300 MG/1
300 TABLET ORAL EVERY MORNING
Qty: 30 TABLET | Refills: 2 | Status: SHIPPED | OUTPATIENT
Start: 2022-12-15 | End: 2023-03-03

## 2022-12-15 RX ORDER — BUSPIRONE HYDROCHLORIDE 7.5 MG/1
TABLET ORAL
Qty: 60 TABLET | Refills: 2 | Status: SHIPPED | OUTPATIENT
Start: 2022-12-15 | End: 2023-03-03

## 2023-03-03 ENCOUNTER — TELEMEDICINE (OUTPATIENT)
Dept: PSYCHIATRY | Facility: CLINIC | Age: 33
End: 2023-03-03
Payer: COMMERCIAL

## 2023-03-03 DIAGNOSIS — F33.2 SEVERE EPISODE OF RECURRENT MAJOR DEPRESSIVE DISORDER, WITHOUT PSYCHOTIC FEATURES: Primary | ICD-10-CM

## 2023-03-03 DIAGNOSIS — Z79.899 MEDICATION MANAGEMENT: ICD-10-CM

## 2023-03-03 DIAGNOSIS — F41.1 GENERALIZED ANXIETY DISORDER: ICD-10-CM

## 2023-03-03 PROCEDURE — 99214 OFFICE O/P EST MOD 30 MIN: CPT | Performed by: NURSE PRACTITIONER

## 2023-03-03 NOTE — PROGRESS NOTES
This provider is located at the [facility] (through [facility]), [address], [place], [address] using a secure [employer] Video Visit through [employer]. Patient is being seen remotely via telehealth at their home address in [place], and stated they are in a secure environment for this session. The patient's condition being diagnosed/treated is appropriate for telemedicine. The provider identified herself as well as her credentials.   The patient, and/or patients guardian, consent to be seen remotely, and when consent is given they understand that the consent allows for patient identifiable information to be sent to a third party as needed.   They may refuse to be seen remotely at any time. The electronic data is encrypted and password protected, and the patient and/or guardian has been advised of the potential risks to privacy not withstanding such measures.    Subjective   Alexandrea Markham is a 33 y.o. female who presents today for follow up    Chief Complaint:  Depression and anxiety  The is a follow up visit.  She is taking her medication as prescribed, denies side effects. She states she is doing good. She recently found out she is pregnant (yesterday). She stopped taking Wellbutrin about a week, denies any side effects of stopping Wellbutrin. She had ran out of [facility].She has not been taking Buspirone or Hydroxyzine, states anxiety is controlled.  Anxiety rated 3-4/10, depression rated 0/10, with 10 being the worst.   Sleep is good, getting about 8-9 hours a night, denies NM.  Appetite is good.    Denies SI/HI/AVH.  Denies thoughts of self-harm.  She states she is scheduling with OB/GYN for evaluation and prenatal care.      The following portions of the patient's history were reviewed and updated as appropriate: allergies, current medications, past family history, past medical history, past social history, past surgical history and problem list.      Past Medical  History:  History reviewed. No pertinent past medical history.    Social History:  Social History     Socioeconomic History   • Marital status: Single       Family History:  History reviewed. No pertinent family history.    Past Surgical History:  History reviewed. No pertinent surgical history.    Problem List:  There is no problem list on file for this patient.       Allergy:   Allergies   Allergen Reactions   • Sulfa Antibiotics Hives        Current Medications:   Current Outpatient Medications   Medication Sig Dispense Refill   • sertraline (Zoloft) 50 MG tablet Take 1 tablet by mouth Daily. 30 tablet 1     No current facility-administered medications for this visit.       Review of Symptoms:    Review of Systems   Gastrointestinal: Positive for nausea.   Psychiatric/Behavioral: Positive for depressed mood and stress. Negative for agitation, dysphoric mood, hallucinations, self-injury, sleep disturbance and suicidal ideas. The patient is nervous/anxious.    All other systems reviewed and are negative.        Physical Exam:   There were no vitals taken for this visit.  There is no height or weight on file to calculate BMI.    Appearance:  female appears stated age, no acute distress noted.    Gait, Station, Strength: Steady, posture erect, WNL      Mental Status Exam: 3/3/23  Hygiene:   good  Cooperation:  Cooperative  Eye Contact:  Good  Psychomotor Behavior:  Appropriate  Affect:  Appropriate  Mood: normal  Hopelessness: Denies  Speech:  Normal  Thought Process:  Linear  Thought Content:  Mood congruent  Suicidal:  None  Homicidal:  None  Hallucinations:  None  Delusion:  None  Memory:  Intact  Orientation:  Person, Place, Time and Situation  Reliability:  good  Insight:  Good  Judgement:  Good  Impulse Control:  Good  Physical/Medical Issues:  No      PHQ-Score Total:  PHQ-9 Total Score:        Lab Results:     Reviewed most recent lab reports 2/2022    No visits with results within 1 Month(s) from  this visit.   Latest known visit with results is:   No results found for any previous visit.       Assessment & Plan   Problems Addressed this Visit    None  Visit Diagnoses     Severe episode of recurrent major depressive disorder, without psychotic features (HCC)    -  Primary    Relevant Medications    sertraline (Zoloft) 50 MG tablet    Generalized anxiety disorder        Relevant Medications    sertraline (Zoloft) 50 MG tablet    Medication management        Relevant Medications    sertraline (Zoloft) 50 MG tablet      Diagnoses       Codes Comments    Severe episode of recurrent major depressive disorder, without psychotic features (HCC)    -  Primary ICD-10-CM: F33.2  ICD-9-CM: 296.33     Generalized anxiety disorder     ICD-10-CM: F41.1  ICD-9-CM: 300.02     Medication management     ICD-10-CM: Z79.899  ICD-9-CM: V58.69         Social History     Tobacco Use   Smoking Status Not on file   Smokeless Tobacco Not on file     MALLORY reviewed and appropriate. Patient counseled on use of controlled substances.     -The benefits of a healthy diet and exercise were discussed with patient, especially the positive effects they have on mental health. Patient encouraged to consider lifestyle modification regarding  diet and exercise patterns to maximize results of mental health treatment.  -Reviewed previous available documentation  -Reviewed most recent available labs   -I've explained to her that drugs of the SSRI class can have side effects such as weight gain, sexual dysfunction, insomnia, headache, nausea. These medications are generally effective at alleviating symptoms of anxiety and/or depression. Let me know if significant side effects do occur.    -Patient was educated that the above medications can have potential risk to a developing fetus. Pt verbalizes understanding and acknowledged.      Visit Diagnoses:    ICD-10-CM ICD-9-CM   1. Severe episode of recurrent major depressive disorder, without psychotic  features (HCC)  F33.2 296.33   2. Generalized anxiety disorder  F41.1 300.02   3. Medication management  Z79.899 V58.69       TREATMENT PLAN/GOALS: Continue supportive psychotherapy efforts and medications as indicated. Treatment and medication options discussed during today's visit. Patient acknowledged and verbally consented to continue with current treatment plan and was educated on the importance of compliance with treatment and follow-up appointments.    MEDICATION ISSUES:    Discussed medication options and treatment plan of prescribed medication as well as the risks, benefits, and side effects including potential falls, possible impaired driving and metabolic adversities among others. Patient is agreeable to call the office with any worsening of symptoms or onset of side effects. Patient is agreeable to call 911 or go to the nearest ER should he/she begin having SI/HI.     MEDS ORDERED DURING VISIT:  New Medications Ordered This Visit   Medications   • sertraline (Zoloft) 50 MG tablet     Sig: Take 1 tablet by mouth Daily.     Dispense:  30 tablet     Refill:  1     -D/C Wellbutrin   -D/C hydroxyzine   -D/C buspirone   -Start sertraline 50 mg tablet take 1 tablet by mouth daily for depression and anxiety.  -Recommend psychotherapy.    Return in about 2 months (around 5/3/2023).    I spent 30 minutes caring for Alexandrea on this date of service. This time includes time spent by me in the following activities: preparing for the visit, obtaining and/or reviewing a separately obtained history, performing a medically appropriate examination and/or evaluation, counseling and educating the patient/family/caregiver, ordering medications, tests, or procedures and documenting information in the medical record             This document has been electronically signed by HORACIO Cornelius  March 3, 2023 10:52 EST    Part of this note may be an electronic transcription/translation of spoken language to printed  text using the Dragon Dictation System.

## 2023-04-27 NOTE — PROGRESS NOTES
This provider is located at the [facility] (through [facility]), [address], [place], [address] using a secure [employer] Video Visit through [employer]. Patient is being seen remotely via telehealth at their home address in [place], and stated they are in a secure environment for this session. The patient's condition being diagnosed/treated is appropriate for telemedicine. The provider identified herself as well as her credentials.   The patient, and/or patients guardian, consent to be seen remotely, and when consent is given they understand that the consent allows for patient identifiable information to be sent to a third party as needed.   They may refuse to be seen remotely at any time. The electronic data is encrypted and password protected, and the patient and/or guardian has been advised of the potential risks to privacy not withstanding such measures.    Subjective   Alexandrea Markham is a 33 y.o. female who presents today for follow up    Chief Complaint:  Depression and anxiety  The is a follow up visit.  She is taking her medication as prescribed, denies side effects. She is pregnant, 12 3/7 weeks, she had her first OB appointment yesterday. She reports her OB approved of Zoloft continuing. She reports there was an incident at a ballgame the other night, she states her mother in law was upset.. She admits that she had an attitude, but she apologized to her, but the other person didn't accept it. States her husbands ex, didn't get awarded increased custody.   Anxiety rated 4/10, depression rated 2/10, with 10 being the worst.   Sleep is good, she is getting up to go to the bathroom several times, getting about 8-9 hours a night, denies NM.  Appetite is good, has some nausea.  Denies SI/HI/AVH.  Denies thoughts of self-harm.      The following portions of the patient's history were reviewed and updated as appropriate: allergies, current medications, past family  history, past medical history, past social history, past surgical history and problem list.      Past Medical History:  History reviewed. No pertinent past medical history.    Social History:  Social History     Socioeconomic History   • Marital status: Single       Family History:  History reviewed. No pertinent family history.    Past Surgical History:  History reviewed. No pertinent surgical history.    Problem List:  There is no problem list on file for this patient.       Allergy:   Allergies   Allergen Reactions   • Sulfa Antibiotics Hives        Current Medications:   Current Outpatient Medications   Medication Sig Dispense Refill   • sertraline (Zoloft) 50 MG tablet Take 1 tablet by mouth Daily. 90 tablet 1     No current facility-administered medications for this visit.       Review of Symptoms:    Review of Systems   Gastrointestinal: Positive for nausea.   Psychiatric/Behavioral: Positive for sleep disturbance, depressed mood and stress. Negative for dysphoric mood, hallucinations, self-injury and suicidal ideas. The patient is nervous/anxious.    All other systems reviewed and are negative.        Physical Exam:   There were no vitals taken for this visit.  There is no height or weight on file to calculate BMI.    Appearance:  female appears stated age, no acute distress noted.    Gait, Station, Strength: Steady, posture erect, WNL      Mental Status Exam: 4/28/23  Hygiene:   good  Cooperation:  Cooperative  Eye Contact:  Good  Psychomotor Behavior:  Appropriate  Affect:  Appropriate  Mood: normal  Hopelessness: Denies  Speech:  Normal  Thought Process:  Linear  Thought Content:  Mood congruent  Suicidal:  None  Homicidal:  None  Hallucinations:  None  Delusion:  None  Memory:  Intact  Orientation:  Person, Place, Time and Situation  Reliability:  good  Insight:  Good  Judgement:  Good  Impulse Control:  Good  Physical/Medical Issues:  No      PHQ-Score Total:  PHQ-9 Total Score:  0      Lab  Results:     Reviewed most recent lab reports 2/2022    No visits with results within 1 Month(s) from this visit.   Latest known visit with results is:   No results found for any previous visit.       Assessment & Plan   Problems Addressed this Visit    None  Visit Diagnoses     Severe episode of recurrent major depressive disorder, without psychotic features    -  Primary    Relevant Medications    sertraline (Zoloft) 50 MG tablet    Generalized anxiety disorder        Relevant Medications    sertraline (Zoloft) 50 MG tablet    Medication management        Relevant Medications    sertraline (Zoloft) 50 MG tablet      Diagnoses       Codes Comments    Severe episode of recurrent major depressive disorder, without psychotic features    -  Primary ICD-10-CM: F33.2  ICD-9-CM: 296.33     Generalized anxiety disorder     ICD-10-CM: F41.1  ICD-9-CM: 300.02     Medication management     ICD-10-CM: Z79.899  ICD-9-CM: V58.69         Social History     Tobacco Use   Smoking Status Not on file   Smokeless Tobacco Not on file     MALLORY reviewed and appropriate. Patient counseled on use of controlled substances.     -The benefits of a healthy diet and exercise were discussed with patient, especially the positive effects they have on mental health. Patient encouraged to consider lifestyle modification regarding  diet and exercise patterns to maximize results of mental health treatment.  -Reviewed previous available documentation  -Reviewed most recent available labs   -I've explained to her that drugs of the SSRI class can have side effects such as weight gain, sexual dysfunction, insomnia, headache, nausea. These medications are generally effective at alleviating symptoms of anxiety and/or depression. Let me know if significant side effects do occur.    -Patient was educated that the above medications can have potential risk to a developing fetus. Pt verbalizes understanding and acknowledged.      Visit Diagnoses:    ICD-10-CM  ICD-9-CM   1. Severe episode of recurrent major depressive disorder, without psychotic features  F33.2 296.33   2. Generalized anxiety disorder  F41.1 300.02   3. Medication management  Z79.899 V58.69       TREATMENT PLAN/GOALS: Continue supportive psychotherapy efforts and medications as indicated. Treatment and medication options discussed during today's visit. Patient acknowledged and verbally consented to continue with current treatment plan and was educated on the importance of compliance with treatment and follow-up appointments.    MEDICATION ISSUES:    Discussed medication options and treatment plan of prescribed medication as well as the risks, benefits, and side effects including potential falls, possible impaired driving and metabolic adversities among others. Patient is agreeable to call the office with any worsening of symptoms or onset of side effects. Patient is agreeable to call 911 or go to the nearest ER should he/she begin having SI/HI.     MEDS ORDERED DURING VISIT:  New Medications Ordered This Visit   Medications   • sertraline (Zoloft) 50 MG tablet     Sig: Take 1 tablet by mouth Daily.     Dispense:  90 tablet     Refill:  1       -Continue sertraline 50 mg tablet take 1 tablet by mouth daily for depression and anxiety.  -Recommend psychotherapy.    Return in about 3 months (around 7/28/2023).    I spent 30 minutes caring for Alexandrea on this date of service. This time includes time spent by me in the following activities: preparing for the visit, obtaining and/or reviewing a separately obtained history, performing a medically appropriate examination and/or evaluation, counseling and educating the patient/family/caregiver and referring and communicating with other health care professionals             This document has been electronically signed by HORACIO Cornelius  April 28, 2023 09:09 EDT    Part of this note may be an electronic transcription/translation of spoken language to  printed text using the Dragon Dictation System.

## 2023-04-28 ENCOUNTER — TELEMEDICINE (OUTPATIENT)
Dept: PSYCHIATRY | Facility: CLINIC | Age: 33
End: 2023-04-28
Payer: COMMERCIAL

## 2023-04-28 DIAGNOSIS — Z79.899 MEDICATION MANAGEMENT: ICD-10-CM

## 2023-04-28 DIAGNOSIS — F33.2 SEVERE EPISODE OF RECURRENT MAJOR DEPRESSIVE DISORDER, WITHOUT PSYCHOTIC FEATURES: Primary | ICD-10-CM

## 2023-04-28 DIAGNOSIS — F41.1 GENERALIZED ANXIETY DISORDER: ICD-10-CM

## 2023-07-28 DIAGNOSIS — Z79.899 MEDICATION MANAGEMENT: ICD-10-CM

## 2023-07-28 DIAGNOSIS — F33.2 SEVERE EPISODE OF RECURRENT MAJOR DEPRESSIVE DISORDER, WITHOUT PSYCHOTIC FEATURES: ICD-10-CM

## 2023-07-28 DIAGNOSIS — F41.1 GENERALIZED ANXIETY DISORDER: ICD-10-CM

## 2023-08-02 NOTE — PROGRESS NOTES
This provider is located at the [facility] (through [facility]), [address] [place] [address], [place], [address] using a secure MyChart Video Visit through [employer]. Patient is being seen remotely via telehealth at their home address in [place], and stated they are in a secure environment for this session. The patient's condition being diagnosed/treated is appropriate for telemedicine. The provider identified herself as well as her credentials.   The patient, and/or patients guardian, consent to be seen remotely, and when consent is given they understand that the consent allows for patient identifiable information to be sent to a third party as needed.   They may refuse to be seen remotely at any time. The electronic data is encrypted and password protected, and the patient and/or guardian has been advised of the potential risks to privacy not withstanding such measures.    Julia Markham is a 33 y.o. female who presents today for follow up    Chief Complaint:  Depression and anxiety  Today is a follow-up visit.     Medication compliance: patient is compliant with medications, denies SE.  Depression rated 1/10, with 10 being the worst.  Anxiety rated 4/10, with 10 being the worst.    Sleep is fair, getting about 8 hours a night,  Nightmares: Denies  Appetite is good.  Hallucinations: Patient denies auditory hallucinations and visual hallucinations  Self-harm: Patient denies thoughts of self-harm.  Alcohol use: no  Drug use: no  Marijuana use: no     Chronic health issues, no acute physical or medical issues today.    Details: she is 27 2/7 days IUP, she states pregnancy is going well. She states her husbands ex wife is causing issues with custody issues, having to go to court. She reports OB/GYN is aware she is taking Zoloft, and is in agreement she continue it.  She is getting nervous with delivery getting closer. She isn't sleeping as well as her pregnancy progresses.        The following portions of the patient's history were reviewed and updated as appropriate: allergies, current medications, past family history, past medical history, past social history, past surgical history and problem list.      Past Medical History:  History reviewed. No pertinent past medical history.    Social History:  Social History     Socioeconomic History    Marital status: Single       Family History:  History reviewed. No pertinent family history.    Past Surgical History:  History reviewed. No pertinent surgical history.    Problem List:  There is no problem list on file for this patient.       Allergy:   Allergies   Allergen Reactions    Sulfa Antibiotics Hives        Current Medications:   Current Outpatient Medications   Medication Sig Dispense Refill    sertraline (Zoloft) 50 MG tablet Take 1 tablet by mouth Daily. 90 tablet 1     No current facility-administered medications for this visit.       Review of Symptoms:    Review of Systems   Gastrointestinal:  Positive for nausea.   Psychiatric/Behavioral:  Positive for sleep disturbance, depressed mood and stress. Negative for dysphoric mood, hallucinations, self-injury and suicidal ideas. The patient is nervous/anxious.    All other systems reviewed and are negative.      Physical Exam:   There were no vitals taken for this visit.  There is no height or weight on file to calculate BMI.    8/10/23    MENTAL STATUS EXAM   General Appearance:  Cleanly groomed and dressed  Eye Contact:  Good eye contact  Attitude:  Cooperative  Motor Activity:  Normal gait, posture  Speech:  Normal rate, tone, volume  Language:  Spontaneous  Mood and affect:  Normal, pleasant  Hopelessness:  Denies  Thought Process:  Logical  Associations/ Thought Content:  No delusions  Hallucinations:  None  Suicidal Ideations:  Not present  Homicidal Ideation:  Not present  Sensorium:  Alert  Orientation:  Person, place, time and situation  Insight:  Fair  Judgement:   Fair  Reliability:  Fair  Impulse Control:  Fair    PHQ-9 Total Score:      Lab Results:       No visits with results within 1 Month(s) from this visit.   Latest known visit with results is:   No results found for any previous visit.       Assessment & Plan   Problems Addressed this Visit    None  Visit Diagnoses       Severe episode of recurrent major depressive disorder, without psychotic features    -  Primary    Relevant Medications    sertraline (Zoloft) 50 MG tablet    Generalized anxiety disorder        Relevant Medications    sertraline (Zoloft) 50 MG tablet    Medication management        Relevant Medications    sertraline (Zoloft) 50 MG tablet    Pregnancy, unspecified gestational age              Diagnoses         Codes Comments    Severe episode of recurrent major depressive disorder, without psychotic features    -  Primary ICD-10-CM: F33.2  ICD-9-CM: 296.33     Generalized anxiety disorder     ICD-10-CM: F41.1  ICD-9-CM: 300.02     Medication management     ICD-10-CM: Z79.899  ICD-9-CM: V58.69     Pregnancy, unspecified gestational age     ICD-10-CM: Z34.90  ICD-9-CM: V22.2           Social History     Tobacco Use   Smoking Status Not on file   Smokeless Tobacco Not on file     MALLORY reviewed and appropriate. Patient counseled on use of controlled substances.     -The benefits of a healthy diet and exercise were discussed with patient, especially the positive effects they have on mental health. Patient encouraged to consider lifestyle modification regarding  diet and exercise patterns to maximize results of mental health treatment.  -Reviewed previous available documentation  -Reviewed most recent available labs   -I've explained to her that drugs of the SSRI class can have side effects such as weight gain, sexual dysfunction, insomnia, headache, nausea. These medications are generally effective at alleviating symptoms of anxiety and/or depression. Let me know if significant side effects do  occur.    -Patient was educated that the above medications can have potential risk to a developing fetus. Pt verbalizes understanding and acknowledged.      Visit Diagnoses:    ICD-10-CM ICD-9-CM   1. Severe episode of recurrent major depressive disorder, without psychotic features  F33.2 296.33   2. Generalized anxiety disorder  F41.1 300.02   3. Medication management  Z79.899 V58.69   4. Pregnancy, unspecified gestational age  Z34.90 V22.2       TREATMENT PLAN/GOALS: Continue supportive psychotherapy efforts and medications as indicated. Treatment and medication options discussed during today's visit. Patient acknowledged and verbally consented to continue with current treatment plan and was educated on the importance of compliance with treatment and follow-up appointments.    MEDICATION ISSUES:    Discussed medication options and treatment plan of prescribed medication as well as the risks, benefits, and side effects including potential falls, possible impaired driving and metabolic adversities among others. Patient is agreeable to call the office with any worsening of symptoms or onset of side effects. Patient is agreeable to call 911 or go to the nearest ER should he/she begin having SI/HI.     MEDS ORDERED DURING VISIT:  New Medications Ordered This Visit   Medications    sertraline (Zoloft) 50 MG tablet     Sig: Take 1 tablet by mouth Daily.     Dispense:  90 tablet     Refill:  1     -Continue sertraline 50 mg tablet take 1 tablet by mouth daily for depression and anxiety.    Return in about 4 months (around 12/10/2023).    I spent 30 minutes caring for Alexandrea on this date of service. This time includes time spent by me in the following activities: preparing for the visit, obtaining and/or reviewing a separately obtained history, performing a medically appropriate examination and/or evaluation, counseling and educating the patient/family/caregiver, ordering medications, tests, or procedures, and  documenting information in the medical record           This document has been electronically signed by HORACIO Cornelius  August 10, 2023 09:25 EDT    Part of this note may be an electronic transcription/translation of spoken language to printed text using the Dragon Dictation System.

## 2023-08-10 ENCOUNTER — TELEMEDICINE (OUTPATIENT)
Dept: PSYCHIATRY | Facility: CLINIC | Age: 33
End: 2023-08-10
Payer: COMMERCIAL

## 2023-08-10 DIAGNOSIS — Z34.90 PREGNANCY, UNSPECIFIED GESTATIONAL AGE: ICD-10-CM

## 2023-08-10 DIAGNOSIS — F33.2 SEVERE EPISODE OF RECURRENT MAJOR DEPRESSIVE DISORDER, WITHOUT PSYCHOTIC FEATURES: Primary | ICD-10-CM

## 2023-08-10 DIAGNOSIS — F41.1 GENERALIZED ANXIETY DISORDER: ICD-10-CM

## 2023-08-10 DIAGNOSIS — Z79.899 MEDICATION MANAGEMENT: ICD-10-CM

## 2023-12-06 NOTE — PROGRESS NOTES
This provider is located at the [facility] (through [facility]), [address], [place], [address] using a secure MyChart Video Visit through [employer]. Patient is being seen remotely via telehealth at their home address in [place], and stated they are in a secure environment for this session. The patient's condition being diagnosed/treated is appropriate for telemedicine. The provider identified herself as well as her credentials.   The patient, and/or patients guardian, consent to be seen remotely, and when consent is given they understand that the consent allows for patient identifiable information to be sent to a third party as needed.   They may refuse to be seen remotely at any time. The electronic data is encrypted and password protected, and the patient and/or guardian has been advised of the potential risks to privacy not withstanding such measures.    Julia Markham is a 33 y.o. female who presents today for follow up    Chief Complaint:  Depression and anxiety  Today is a follow-up visit.     Medication compliance: patient is compliant with medications, denies SE.  Depression rated 1/10, with 10 being the worst.  Anxiety rated 1/10, with 10 being the worst.    Sleep is fair, getting about 8 hours a night,  Nightmares: Denies  Appetite is good.  Hallucinations: Patient denies auditory hallucinations and visual hallucinations  Self-harm: Patient denies thoughts of self-harm.  Alcohol use: no  Drug use: no  Marijuana use: no     Chronic health issues, no acute physical or medical issues today.    Details: she delivered by c/section 10/22/23, a boy, she is breastfeeding, no problems with delivery, and baby is doing well. She states she is doing good on current  medications. She returns to work January 8, 2024. Baby is sleeping longer at a time, she wakes to breastfeed.       The following portions of the patient's history were reviewed and updated as  appropriate: allergies, current medications, past family history, past medical history, past social history, past surgical history and problem list.      Past Medical History:  History reviewed. No pertinent past medical history.    Social History:  Social History     Socioeconomic History    Marital status: Single       Family History:  History reviewed. No pertinent family history.    Past Surgical History:  History reviewed. No pertinent surgical history.    Problem List:  There is no problem list on file for this patient.       Allergy:   Allergies   Allergen Reactions    Sulfa Antibiotics Hives        Current Medications:   Current Outpatient Medications   Medication Sig Dispense Refill    sertraline (Zoloft) 50 MG tablet Take 1 tablet by mouth Daily. 90 tablet 1     No current facility-administered medications for this visit.       Review of Symptoms:    Review of Systems   Psychiatric/Behavioral:  Positive for sleep disturbance, depressed mood and stress. Negative for dysphoric mood, hallucinations, self-injury and suicidal ideas. The patient is nervous/anxious.    All other systems reviewed and are negative.        Physical Exam:   There were no vitals taken for this visit.  There is no height or weight on file to calculate BMI.    12/13/23    MENTAL STATUS EXAM   General Appearance:  Cleanly groomed and dressed  Eye Contact:  Good eye contact  Attitude:  Cooperative  Motor Activity:  Normal gait, posture  Speech:  Normal rate, tone, volume  Language:  Spontaneous  Mood and affect:  Normal, pleasant  Hopelessness:  Denies  Thought Process:  Logical  Associations/ Thought Content:  No delusions  Hallucinations:  None  Suicidal Ideations:  Not present  Homicidal Ideation:  Not present  Sensorium:  Alert  Orientation:  Person, place, time and situation  Insight:  Fair  Judgement:  Fair  Reliability:  Fair  Impulse Control:  Fair      PHQ-9 Total Score: (P) 1    Lab Results:       No visits with results within 1  Month(s) from this visit.   Latest known visit with results is:   No results found for any previous visit.       Assessment & Plan   Problems Addressed this Visit    None  Visit Diagnoses       Severe episode of recurrent major depressive disorder, without psychotic features    -  Primary    Relevant Medications    sertraline (Zoloft) 50 MG tablet    Generalized anxiety disorder        Relevant Medications    sertraline (Zoloft) 50 MG tablet    Medication management        Relevant Medications    sertraline (Zoloft) 50 MG tablet          Diagnoses         Codes Comments    Severe episode of recurrent major depressive disorder, without psychotic features    -  Primary ICD-10-CM: F33.2  ICD-9-CM: 296.33     Generalized anxiety disorder     ICD-10-CM: F41.1  ICD-9-CM: 300.02     Medication management     ICD-10-CM: Z79.899  ICD-9-CM: V58.69           Social History     Tobacco Use   Smoking Status Not on file   Smokeless Tobacco Not on file     MALLORY reviewed and appropriate. Patient counseled on use of controlled substances.     -The benefits of a healthy diet and exercise were discussed with patient, especially the positive effects they have on mental health. Patient encouraged to consider lifestyle modification regarding  diet and exercise patterns to maximize results of mental health treatment.  -Reviewed previous available documentation  -Reviewed most recent available labs   -I've explained to her that drugs of the SSRI class can have side effects such as weight gain, sexual dysfunction, insomnia, headache, nausea. These medications are generally effective at alleviating symptoms of anxiety and/or depression. Let me know if significant side effects do occur.         Visit Diagnoses:    ICD-10-CM ICD-9-CM   1. Severe episode of recurrent major depressive disorder, without psychotic features  F33.2 296.33   2. Generalized anxiety disorder  F41.1 300.02   3. Medication management  Z79.899 V58.69         TREATMENT  PLAN/GOALS: Continue supportive psychotherapy efforts and medications as indicated. Treatment and medication options discussed during today's visit. Patient acknowledged and verbally consented to continue with current treatment plan and was educated on the importance of compliance with treatment and follow-up appointments.    MEDICATION ISSUES:    Discussed medication options and treatment plan of prescribed medication as well as the risks, benefits, and side effects including potential falls, possible impaired driving and metabolic adversities among others. Patient is agreeable to call the office with any worsening of symptoms or onset of side effects. Patient is agreeable to call 911 or go to the nearest ER should he/she begin having SI/HI.     MEDS ORDERED DURING VISIT:  New Medications Ordered This Visit   Medications    sertraline (Zoloft) 50 MG tablet     Sig: Take 1 tablet by mouth Daily.     Dispense:  90 tablet     Refill:  1     She is breastfeeding.    -Continue sertraline 50 mg tablet take 1 tablet by mouth daily for depression and anxiety.    Return in about 3 months (around 3/13/2024).    I spent 30 minutes caring for Alexandrea on this date of service. This time includes time spent by me in the following activities: preparing for the visit, obtaining and/or reviewing a separately obtained history, performing a medically appropriate examination and/or evaluation, counseling and educating the patient/family/caregiver, ordering medications, tests, or procedures, and documenting information in the medical record             This document has been electronically signed by HORACIO Cornelius  December 13, 2023 09:35 EST    Part of this note may be an electronic transcription/translation of spoken language to printed text using the Dragon Dictation System.

## 2023-12-13 ENCOUNTER — TELEMEDICINE (OUTPATIENT)
Dept: PSYCHIATRY | Facility: CLINIC | Age: 33
End: 2023-12-13
Payer: COMMERCIAL

## 2023-12-13 DIAGNOSIS — Z79.899 MEDICATION MANAGEMENT: ICD-10-CM

## 2023-12-13 DIAGNOSIS — F33.2 SEVERE EPISODE OF RECURRENT MAJOR DEPRESSIVE DISORDER, WITHOUT PSYCHOTIC FEATURES: Primary | ICD-10-CM

## 2023-12-13 DIAGNOSIS — F41.1 GENERALIZED ANXIETY DISORDER: ICD-10-CM

## 2024-03-07 NOTE — PROGRESS NOTES
"This provider is located at the [facility] (through [facility]), [address], [place], [address] using a secure [employer] Video Visit through [facility]. Patient is being seen remotely via telehealth at their home address in [place], and stated they are in a secure environment for this session. The patient's condition being diagnosed/treated is appropriate for telemedicine. The provider identified herself as well as her credentials.   The patient, and/or patients guardian, consent to be seen remotely, and when consent is given they understand that the consent allows for patient identifiable information to be sent to a third party as needed.   They may refuse to be seen remotely at any time. The electronic data is encrypted and password protected, and the patient and/or guardian has been advised of the potential risks to privacy not withstanding such measures.    Subjective   Alexandrea Markham is a 34 y.o. female who presents today for follow up    Chief Complaint:  Depression and anxiety  Today is a follow-up visit.     Medication compliance: patient is compliant with medications, denies SE.  Depression rated 0/10, with 10 being the worst.  Anxiety rated 3/10, with 10 being the worst.    Sleep is good, getting about 8 hours a night,  Nightmares: Denies  Appetite is good.  Hallucinations: Patient denies auditory hallucinations and visual hallucinations  Self-harm: Patient denies thoughts of self-harm.  Alcohol use: no  Drug use: no  Marijuana use: no     Chronic health issues, no acute physical or medical issues today.    Details: She states her and her  are having to go back to court, his exwife is taking them to court, because the child calls her \"mom\". 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 She also got engaged. She is sleeping good, her infant is sleeping about 6 hours straight. She continues to breastfeed.        The following portions of the patient's history " were reviewed and updated as appropriate: allergies, current medications, past family history, past medical history, past social history, past surgical history and problem list.      Past Medical History:  History reviewed. No pertinent past medical history.    Social History:  Social History     Socioeconomic History    Marital status: Single       Family History:  History reviewed. No pertinent family history.    Past Surgical History:  History reviewed. No pertinent surgical history.    Problem List:  There is no problem list on file for this patient.       Allergy:   Allergies   Allergen Reactions    Sulfa Antibiotics Hives        Current Medications:   Current Outpatient Medications   Medication Sig Dispense Refill    sertraline (Zoloft) 50 MG tablet Take 1 tablet by mouth Daily. 90 tablet 0     No current facility-administered medications for this visit.       Review of Symptoms:    Review of Systems   Psychiatric/Behavioral:  Positive for depressed mood and stress. Negative for dysphoric mood, hallucinations, self-injury, sleep disturbance and suicidal ideas. The patient is nervous/anxious.    All other systems reviewed and are negative.        Physical Exam:   There were no vitals taken for this visit.  There is no height or weight on file to calculate BMI.    3/13/24    MENTAL STATUS EXAM   General Appearance:  Cleanly groomed and dressed  Eye Contact:  Good eye contact  Attitude:  Cooperative  Motor Activity:  Normal gait, posture  Speech:  Normal rate, tone, volume  Language:  Spontaneous  Mood and affect:  Normal, pleasant  Hopelessness:  Denies  Thought Process:  Logical  Associations/ Thought Content:  No delusions  Hallucinations:  None  Suicidal Ideations:  Not present  Homicidal Ideation:  Not present  Sensorium:  Alert  Orientation:  Person, place, time and situation  Insight:  Good  Judgement:  Good  Reliability:  Good  Impulse Control:  Good      PHQ-9 Total Score: (P) 0    Lab Results:       No  visits with results within 1 Month(s) from this visit.   Latest known visit with results is:   No results found for any previous visit.       Assessment & Plan   Problems Addressed this Visit    None  Visit Diagnoses       Severe episode of recurrent major depressive disorder, without psychotic features    -  Primary    Relevant Medications    sertraline (Zoloft) 50 MG tablet    Generalized anxiety disorder        Relevant Medications    sertraline (Zoloft) 50 MG tablet    Medication management        Relevant Medications    sertraline (Zoloft) 50 MG tablet          Diagnoses         Codes Comments    Severe episode of recurrent major depressive disorder, without psychotic features    -  Primary ICD-10-CM: F33.2  ICD-9-CM: 296.33     Generalized anxiety disorder     ICD-10-CM: F41.1  ICD-9-CM: 300.02     Medication management     ICD-10-CM: Z79.899  ICD-9-CM: V58.69           Social History     Tobacco Use   Smoking Status Not on file   Smokeless Tobacco Not on file     MALLORY reviewed and appropriate. Patient counseled on use of controlled substances.     -The benefits of a healthy diet and exercise were discussed with patient, especially the positive effects they have on mental health. Patient encouraged to consider lifestyle modification regarding  diet and exercise patterns to maximize results of mental health treatment.  -Reviewed previous available documentation  -Reviewed most recent available labs   -I've explained to her that drugs of the SSRI class can have side effects such as weight gain, sexual dysfunction, insomnia, headache, nausea. These medications are generally effective at alleviating symptoms of anxiety and/or depression. Let me know if significant side effects do occur.         Visit Diagnoses:    ICD-10-CM ICD-9-CM   1. Severe episode of recurrent major depressive disorder, without psychotic features  F33.2 296.33   2. Generalized anxiety disorder  F41.1 300.02   3. Medication management   Z79.899 V58.69           TREATMENT PLAN/GOALS: Continue supportive psychotherapy efforts and medications as indicated. Treatment and medication options discussed during today's visit. Patient acknowledged and verbally consented to continue with current treatment plan and was educated on the importance of compliance with treatment and follow-up appointments.    MEDICATION ISSUES:    Discussed medication options and treatment plan of prescribed medication as well as the risks, benefits, and side effects including potential falls, possible impaired driving and metabolic adversities among others. Patient is agreeable to call the office with any worsening of symptoms or onset of side effects. Patient is agreeable to call 911 or go to the nearest ER should he/she begin having SI/HI.     MEDS ORDERED DURING VISIT:  New Medications Ordered This Visit   Medications    sertraline (Zoloft) 50 MG tablet     Sig: Take 1 tablet by mouth Daily.     Dispense:  90 tablet     Refill:  0     She continues to breastfeed.    -Continue sertraline 50 mg tablet take 1 tablet by mouth daily for depression and anxiety.    Return in about 3 months (around 6/13/2024).    I spent 30 minutes caring for Alexandrea on this date of service. This time includes time spent by me in the following activities: preparing for the visit, obtaining and/or reviewing a separately obtained history, performing a medically appropriate examination and/or evaluation, counseling and educating the patient/family/caregiver, ordering medications, tests, or procedures, and documenting information in the medical record               This document has been electronically signed by HORACIO Cornelius  March 13, 2024 08:29 EDT    Part of this note may be an electronic transcription/translation of spoken language to printed text using the Dragon Dictation System.

## 2024-03-13 ENCOUNTER — TELEMEDICINE (OUTPATIENT)
Dept: PSYCHIATRY | Facility: CLINIC | Age: 34
End: 2024-03-13
Payer: COMMERCIAL

## 2024-03-13 DIAGNOSIS — F33.2 SEVERE EPISODE OF RECURRENT MAJOR DEPRESSIVE DISORDER, WITHOUT PSYCHOTIC FEATURES: Primary | ICD-10-CM

## 2024-03-13 DIAGNOSIS — Z79.899 MEDICATION MANAGEMENT: ICD-10-CM

## 2024-03-13 DIAGNOSIS — F41.1 GENERALIZED ANXIETY DISORDER: ICD-10-CM

## 2024-08-22 NOTE — PROGRESS NOTES
This provider is located at the [facility] (through [facility]), [address], [place], [address] using a secure MyChart Video Visit through [email or web address]. Patient is being seen remotely via telehealth at their home address in [place], and stated they are in a secure environment for this session. The patient's condition being diagnosed/treated is appropriate for telemedicine. The provider identified herself as well as her credentials.   The patient, and/or patients guardian, consent to be seen remotely, and when consent is given they understand that the consent allows for patient identifiable information to be sent to a third party as needed.   They may refuse to be seen remotely at any time. The electronic data is encrypted and password protected, and the patient and/or guardian has been advised of the potential risks to privacy not withstanding such measures.    Julia Markham is a 34 y.o. female who presents today for follow up    Chief Complaint:  Depression and anxiety  Today is a follow-up visit.     Medication compliance: patient is compliant with medications, denies SE.  Depression rated 1/10, with 10 being the worst.  Anxiety rated 3/10, with 10 being the worst.    Sleep is good, getting about 8 hours a night,  Nightmares: Denies  Appetite is good.  Hallucinations: Patient denies auditory hallucinations and visual hallucinations  Self-harm: Patient denies thoughts of self-harm.  Alcohol use: no  Drug use: no  Marijuana use: no     Chronic health issues, no acute physical or medical issues today.    Details:This is her last week in her current position. Starts next week at [employer].  Her child is 10 months old, her son has started 2nd grade. Overall she is doing wonderful.      The following portions of the patient's history were reviewed and updated as appropriate: allergies, current medications, past family history, past medical history,  past social history, past surgical history and problem list.      Past Medical History:  History reviewed. No pertinent past medical history.    Social History:  Social History     Socioeconomic History    Marital status:        Family History:  History reviewed. No pertinent family history.    Past Surgical History:  History reviewed. No pertinent surgical history.    Problem List:  There is no problem list on file for this patient.       Allergy:   Allergies   Allergen Reactions    Sulfa Antibiotics Hives        Current Medications:   Current Outpatient Medications   Medication Sig Dispense Refill    sertraline (Zoloft) 50 MG tablet Take 1 tablet by mouth Daily. 90 tablet 0     No current facility-administered medications for this visit.       Review of Symptoms:    Review of Systems   Psychiatric/Behavioral:  Positive for depressed mood and stress. Negative for agitation, dysphoric mood, hallucinations, self-injury, sleep disturbance and suicidal ideas. The patient is nervous/anxious.    All other systems reviewed and are negative.        Physical Exam:   unknown if currently breastfeeding.  There is no height or weight on file to calculate BMI.    08/26/24      MENTAL STATUS EXAM   General Appearance:  Cleanly groomed and dressed  Eye Contact:  Good eye contact  Attitude:  Cooperative  Motor Activity:  Normal gait, posture  Speech:  Normal rate, tone, volume  Language:  Spontaneous  Mood and affect:  Normal, pleasant  Hopelessness:  Denies  Thought Process:  Logical  Associations/ Thought Content:  No delusions  Hallucinations:  None  Suicidal Ideations:  Not present  Homicidal Ideation:  Not present  Sensorium:  Alert  Orientation:  Person, place, time and situation  Insight:  Good  Judgement:  Good  Reliability:  Good  Impulse Control:  Good      PHQ-9 Total Score: (P) 1    Lab Results:       No visits with results within 1 Month(s) from this visit.   Latest known visit with results is:   No results  found for any previous visit.       Assessment & Plan   Problems Addressed this Visit    None  Visit Diagnoses       Severe episode of recurrent major depressive disorder, without psychotic features    -  Primary    Relevant Medications    sertraline (Zoloft) 50 MG tablet    Generalized anxiety disorder        Relevant Medications    sertraline (Zoloft) 50 MG tablet    Medication management        Relevant Medications    sertraline (Zoloft) 50 MG tablet          Diagnoses         Codes Comments    Severe episode of recurrent major depressive disorder, without psychotic features    -  Primary ICD-10-CM: F33.2  ICD-9-CM: 296.33     Generalized anxiety disorder     ICD-10-CM: F41.1  ICD-9-CM: 300.02     Medication management     ICD-10-CM: Z79.899  ICD-9-CM: V58.69           Social History     Tobacco Use   Smoking Status Not on file   Smokeless Tobacco Not on file     MALLORY reviewed and appropriate. Patient counseled on use of controlled substances.     -The benefits of a healthy diet and exercise were discussed with patient, especially the positive effects they have on mental health. Patient encouraged to consider lifestyle modification regarding  diet and exercise patterns to maximize results of mental health treatment.  -Reviewed previous available documentation  -Reviewed most recent available labs   -I've explained to her that drugs of the SSRI class can have side effects such as weight gain, sexual dysfunction, insomnia, headache, nausea. These medications are generally effective at alleviating symptoms of anxiety and/or depression. Let me know if significant side effects do occur.         Visit Diagnoses:    ICD-10-CM ICD-9-CM   1. Severe episode of recurrent major depressive disorder, without psychotic features  F33.2 296.33   2. Generalized anxiety disorder  F41.1 300.02   3. Medication management  Z79.899 V58.69       TREATMENT PLAN/GOALS: Continue supportive psychotherapy efforts and medications as  indicated. Treatment and medication options discussed during today's visit. Patient acknowledged and verbally consented to continue with current treatment plan and was educated on the importance of compliance with treatment and follow-up appointments.    MEDICATION ISSUES:    Discussed medication options and treatment plan of prescribed medication as well as the risks, benefits, and side effects including potential falls, possible impaired driving and metabolic adversities among others. Patient is agreeable to call the office with any worsening of symptoms or onset of side effects. Patient is agreeable to call 911 or go to the nearest ER should he/she begin having SI/HI.     MEDS ORDERED DURING VISIT:  New Medications Ordered This Visit   Medications    sertraline (Zoloft) 50 MG tablet     Sig: Take 1 tablet by mouth Daily.     Dispense:  90 tablet     Refill:  0     She continues to breastfeed.    -Continue sertraline 50 mg tablet take 1 tablet by mouth daily for depression and anxiety.    Return in about 3 months (around 11/26/2024).    I spent 30 minutes caring for Alexandrea on this date of service. This time includes time spent by me in the following activities: preparing for the visit, performing a medically appropriate examination and/or evaluation, counseling and educating the patient/family/caregiver, documenting information in the medical record, and ordering medications             This document has been electronically signed by HORACIO Cornelius  August 26, 2024 12:56 EDT    Part of this note may be an electronic transcription/translation of spoken language to printed text using the Dragon Dictation System.

## 2024-08-26 ENCOUNTER — TELEMEDICINE (OUTPATIENT)
Dept: PSYCHIATRY | Facility: CLINIC | Age: 34
End: 2024-08-26
Payer: COMMERCIAL

## 2024-08-26 DIAGNOSIS — F41.1 GENERALIZED ANXIETY DISORDER: ICD-10-CM

## 2024-08-26 DIAGNOSIS — F33.2 SEVERE EPISODE OF RECURRENT MAJOR DEPRESSIVE DISORDER, WITHOUT PSYCHOTIC FEATURES: Primary | ICD-10-CM

## 2024-08-26 DIAGNOSIS — Z79.899 MEDICATION MANAGEMENT: ICD-10-CM

## 2024-08-26 PROCEDURE — 99214 OFFICE O/P EST MOD 30 MIN: CPT | Performed by: NURSE PRACTITIONER
